# Patient Record
Sex: FEMALE | Race: ASIAN
[De-identification: names, ages, dates, MRNs, and addresses within clinical notes are randomized per-mention and may not be internally consistent; named-entity substitution may affect disease eponyms.]

---

## 2020-09-24 ENCOUNTER — APPOINTMENT (OUTPATIENT)
Dept: ULTRASOUND IMAGING | Facility: CLINIC | Age: 35
End: 2020-09-24
Payer: COMMERCIAL

## 2020-09-24 PROCEDURE — 76830 TRANSVAGINAL US NON-OB: CPT

## 2020-09-29 ENCOUNTER — OUTPATIENT (OUTPATIENT)
Dept: OUTPATIENT SERVICES | Facility: HOSPITAL | Age: 35
LOS: 1 days | End: 2020-09-29

## 2020-09-30 ENCOUNTER — TRANSCRIPTION ENCOUNTER (OUTPATIENT)
Age: 35
End: 2020-09-30

## 2024-08-27 ENCOUNTER — APPOINTMENT (OUTPATIENT)
Dept: OTOLARYNGOLOGY | Facility: CLINIC | Age: 39
End: 2024-08-27

## 2024-08-27 DIAGNOSIS — Z78.9 OTHER SPECIFIED HEALTH STATUS: ICD-10-CM

## 2024-08-27 DIAGNOSIS — S01.319A LACERATION W/OUT FOREIGN BODY OF UNSPECIFIED EAR, INITIAL ENCOUNTER: ICD-10-CM

## 2024-08-27 DIAGNOSIS — Z87.891 PERSONAL HISTORY OF NICOTINE DEPENDENCE: ICD-10-CM

## 2024-08-27 DIAGNOSIS — H92.02 OTALGIA, LEFT EAR: ICD-10-CM

## 2024-08-27 DIAGNOSIS — Z82.3 FAMILY HISTORY OF STROKE: ICD-10-CM

## 2024-08-27 DIAGNOSIS — Z86.39 PERSONAL HISTORY OF OTHER ENDOCRINE, NUTRITIONAL AND METABOLIC DISEASE: ICD-10-CM

## 2024-08-27 PROCEDURE — 12001 RPR S/N/AX/GEN/TRNK 2.5CM/<: CPT

## 2024-08-27 PROCEDURE — 99204 OFFICE O/P NEW MOD 45 MIN: CPT | Mod: 25

## 2024-08-27 RX ORDER — CIPROFLOXACIN HYDROCHLORIDE 250 MG/1
250 TABLET, FILM COATED ORAL
Qty: 14 | Refills: 0 | Status: ACTIVE | COMMUNITY
Start: 2024-08-27 | End: 1900-01-01

## 2024-08-27 RX ORDER — LEVOTHYROXINE SODIUM 0.05 MG/1
50 TABLET ORAL
Refills: 0 | Status: ACTIVE | COMMUNITY

## 2024-08-27 RX ORDER — ALLANTOIN 5 MG/G
GEL TOPICAL
Qty: 1 | Refills: 0 | Status: ACTIVE | COMMUNITY
Start: 2024-08-27 | End: 1900-01-01

## 2024-09-02 NOTE — HISTORY OF PRESENT ILLNESS
[de-identified] : CC: ear laceration   HISTORY OF PRESENT ILLNESS: Ana Luisa Caputo is a 40 y/o female who presents today with ear laceration she woke up this morning and found her earring which is a lawson stud to be missing there's also an apparent laceration as a result  minimal bleeding and pain but there is a significant deformity otherwise healthy   REVIEW OF SYSTEMS: General ROS: negative for - chills, fatigue or fever Psychological ROS: negative for - anxiety or depression Ophthalmic ROS: negative for - blurry vision, decreased vision or double vision ENT ROS: negative except as noted from HPI Allergy and Immunology ROS: negative except as noted from HPI Hematological and Lymphatic ROS: negative for - bleeding problems Endocrine ROS: negative for - malaise/lethargy Respiratory ROS: negative for - stridor Cardiovascular ROS: negative for - chest pain Gastrointestinal ROS: negative for - appetite loss or nausea/vomiting Genitourinary ROS: negative for - incontinence Musculoskeletal ROS: negative for - gait disturbance Neurological ROS: negative for - behavioral changes Dermatological ROS: negative for - nail changes   Physical Exam:   GENERAL APPEARANCE: Well-developed and No Acute Distress. COMMUNICATION: Able to Communicate. Strong Voice.   HEAD AND FACE Eyes: Testing of ocular motility including primary gaze alignment normal. Inspection and Appearance: No evidence of lesions or masses Palpation: Palpation of the face reveals no sinus tenderness Salivary Glands: Symmetric without masses Facial Strength: Symmetric without evidence of facial paralysis   EAR, NOSE, MOUTH, and THROAT: Ear Canals and Tympanic Membranes, Bilateral: No evidence of inflammation or lesions. Thresholds: Clinical speech reception thresholds normal. External, Nose and Auricle: No lesions or masses. Nasal, Mucosa, Septum, and Turbinates: See endoscopy.   NECK: Evaluation: No evidence of masses or crepitus. The neck is symmetric and the trachea is in the midline position. Thyroid: No evidence of enlargement, tenderness or mass. Neck Lymph Nodes: WNL. Respiratory: Inspection of the chest including symmetry, expansion and/or assessment of respiratory effort normal. Cardiovascular: Evaluation of peripheral vascular system by observation and palpation of capillary refill, normal. Neurological/Psychiatric: Alert, Oriented, Mood, and Affect Normal.   Ear laceration repair (12001) after informed consent was obtained, the wound was first clenaed with alcohol wipes and subsequently with betadine solution. the ear was then anesthesized with 1% lidocaine with 1:100,000 epinephrine. the wound edges was then freshened up with an 11 blade and a curved iris scissors were used to undermine the skin. a 6-0 nylon was then used to approximate the edges in an interrupted fashion. after the repair, the skin edges were well approximated. this completed the procedure, the patient tolerated it well.   IMPRESSION: Ms. Caputo is a pleasant 39 year old lady with a left ear laceration from extrusion of her earring, s/p repair   PLAN: -cipro/mederma prescribed -RTC next week for suture removal   Abdon Mills MD [facility] Rhinology and Skull Base Surgery Department of Otolaryngology- Head and Neck Surgery [facility]

## 2024-09-02 NOTE — HISTORY OF PRESENT ILLNESS
[de-identified] : CC: ear laceration   HISTORY OF PRESENT ILLNESS: Ana Luisa Caputo is a 40 y/o female who presents today with ear laceration she woke up this morning and found her earring which is a lawson stud to be missing there's also an apparent laceration as a result  minimal bleeding and pain but there is a significant deformity otherwise healthy   REVIEW OF SYSTEMS: General ROS: negative for - chills, fatigue or fever Psychological ROS: negative for - anxiety or depression Ophthalmic ROS: negative for - blurry vision, decreased vision or double vision ENT ROS: negative except as noted from HPI Allergy and Immunology ROS: negative except as noted from HPI Hematological and Lymphatic ROS: negative for - bleeding problems Endocrine ROS: negative for - malaise/lethargy Respiratory ROS: negative for - stridor Cardiovascular ROS: negative for - chest pain Gastrointestinal ROS: negative for - appetite loss or nausea/vomiting Genitourinary ROS: negative for - incontinence Musculoskeletal ROS: negative for - gait disturbance Neurological ROS: negative for - behavioral changes Dermatological ROS: negative for - nail changes   Physical Exam:   GENERAL APPEARANCE: Well-developed and No Acute Distress. COMMUNICATION: Able to Communicate. Strong Voice.   HEAD AND FACE Eyes: Testing of ocular motility including primary gaze alignment normal. Inspection and Appearance: No evidence of lesions or masses Palpation: Palpation of the face reveals no sinus tenderness Salivary Glands: Symmetric without masses Facial Strength: Symmetric without evidence of facial paralysis   EAR, NOSE, MOUTH, and THROAT: Ear Canals and Tympanic Membranes, Bilateral: No evidence of inflammation or lesions. Thresholds: Clinical speech reception thresholds normal. External, Nose and Auricle: No lesions or masses. Nasal, Mucosa, Septum, and Turbinates: See endoscopy.   NECK: Evaluation: No evidence of masses or crepitus. The neck is symmetric and the trachea is in the midline position. Thyroid: No evidence of enlargement, tenderness or mass. Neck Lymph Nodes: WNL. Respiratory: Inspection of the chest including symmetry, expansion and/or assessment of respiratory effort normal. Cardiovascular: Evaluation of peripheral vascular system by observation and palpation of capillary refill, normal. Neurological/Psychiatric: Alert, Oriented, Mood, and Affect Normal.   Ear laceration repair (12001) after informed consent was obtained, the wound was first clenaed with alcohol wipes and subsequently with betadine solution. the ear was then anesthesized with 1% lidocaine with 1:100,000 epinephrine. the wound edges was then freshened up with an 11 blade and a curved iris scissors were used to undermine the skin. a 6-0 nylon was then used to approximate the edges in an interrupted fashion. after the repair, the skin edges were well approximated. this completed the procedure, the patient tolerated it well.   IMPRESSION: Ms. Caputo is a pleasant 39 year old lady with a left ear laceration from extrusion of her earring, s/p repair   PLAN: -cipro/mederma prescribed -RTC next week for suture removal   Abdon Mills MD [facility] Rhinology and Skull Base Surgery Department of Otolaryngology- Head and Neck Surgery [facility]

## 2024-09-05 ENCOUNTER — APPOINTMENT (OUTPATIENT)
Dept: OTOLARYNGOLOGY | Facility: CLINIC | Age: 39
End: 2024-09-05
Payer: COMMERCIAL

## 2024-09-05 DIAGNOSIS — H92.02 OTALGIA, LEFT EAR: ICD-10-CM

## 2024-09-05 DIAGNOSIS — S01.319A LACERATION W/OUT FOREIGN BODY OF UNSPECIFIED EAR, INITIAL ENCOUNTER: ICD-10-CM

## 2024-09-05 PROCEDURE — 99212 OFFICE O/P EST SF 10 MIN: CPT

## 2024-09-08 NOTE — HISTORY OF PRESENT ILLNESS
[de-identified] : CC: ear laceration   HISTORY OF PRESENT ILLNESS: Ana Luisa Caputo is a 40 y/o female who presents today with ear laceration she woke up this morning and found her earring which is a lawson stud to be missing there's also an apparent laceration as a result  minimal bleeding and pain but there is a significant deformity otherwise healthy  1 week s/p left ear lac repair no complaints   REVIEW OF SYSTEMS: General ROS: negative for - chills, fatigue or fever Psychological ROS: negative for - anxiety or depression Ophthalmic ROS: negative for - blurry vision, decreased vision or double vision ENT ROS: negative except as noted from HPI Allergy and Immunology ROS: negative except as noted from HPI Hematological and Lymphatic ROS: negative for - bleeding problems Endocrine ROS: negative for - malaise/lethargy Respiratory ROS: negative for - stridor Cardiovascular ROS: negative for - chest pain Gastrointestinal ROS: negative for - appetite loss or nausea/vomiting Genitourinary ROS: negative for - incontinence Musculoskeletal ROS: negative for - gait disturbance Neurological ROS: negative for - behavioral changes Dermatological ROS: negative for - nail changes   Physical Exam:   GENERAL APPEARANCE: Well-developed and No Acute Distress. COMMUNICATION: Able to Communicate. Strong Voice.   HEAD AND FACE Eyes: Testing of ocular motility including primary gaze alignment normal. Inspection and Appearance: No evidence of lesions or masses Palpation: Palpation of the face reveals no sinus tenderness Salivary Glands: Symmetric without masses Facial Strength: Symmetric without evidence of facial paralysis   EAR, NOSE, MOUTH, and THROAT: Ear Canals and Tympanic Membranes, Bilateral: No evidence of inflammation or lesions. left ear sutures removed, however, there is poor approximation of the tissues and there's incomplete closure Thresholds: Clinical speech reception thresholds normal. External, Nose and Auricle: No lesions or masses. Nasal, Mucosa, Septum, and Turbinates: See endoscopy.   NECK: Evaluation: No evidence of masses or crepitus. The neck is symmetric and the trachea is in the midline position. Thyroid: No evidence of enlargement, tenderness or mass. Neck Lymph Nodes: WNL. Respiratory: Inspection of the chest including symmetry, expansion and/or assessment of respiratory effort normal. Cardiovascular: Evaluation of peripheral vascular system by observation and palpation of capillary refill, normal. Neurological/Psychiatric: Alert, Oriented, Mood, and Affect Normal.     IMPRESSION: Ms. Caputo is a pleasant 39 year old lady with a left ear laceration from extrusion of her earring, s/p repair   PLAN: -as she's schedule to see Dr. Gusman for Botox, I have advised her to seek definitive repair with her as well. patient expressed understanding   MD ELIZ [facility] Rhinology and Skull Base Surgery Department of Otolaryngology- Head and Neck Surgery [facility]

## 2024-09-08 NOTE — HISTORY OF PRESENT ILLNESS
[de-identified] : CC: ear laceration   HISTORY OF PRESENT ILLNESS: Ana Luisa Caputo is a 40 y/o female who presents today with ear laceration she woke up this morning and found her earring which is a lawson stud to be missing there's also an apparent laceration as a result  minimal bleeding and pain but there is a significant deformity otherwise healthy  1 week s/p left ear lac repair no complaints   REVIEW OF SYSTEMS: General ROS: negative for - chills, fatigue or fever Psychological ROS: negative for - anxiety or depression Ophthalmic ROS: negative for - blurry vision, decreased vision or double vision ENT ROS: negative except as noted from HPI Allergy and Immunology ROS: negative except as noted from HPI Hematological and Lymphatic ROS: negative for - bleeding problems Endocrine ROS: negative for - malaise/lethargy Respiratory ROS: negative for - stridor Cardiovascular ROS: negative for - chest pain Gastrointestinal ROS: negative for - appetite loss or nausea/vomiting Genitourinary ROS: negative for - incontinence Musculoskeletal ROS: negative for - gait disturbance Neurological ROS: negative for - behavioral changes Dermatological ROS: negative for - nail changes   Physical Exam:   GENERAL APPEARANCE: Well-developed and No Acute Distress. COMMUNICATION: Able to Communicate. Strong Voice.   HEAD AND FACE Eyes: Testing of ocular motility including primary gaze alignment normal. Inspection and Appearance: No evidence of lesions or masses Palpation: Palpation of the face reveals no sinus tenderness Salivary Glands: Symmetric without masses Facial Strength: Symmetric without evidence of facial paralysis   EAR, NOSE, MOUTH, and THROAT: Ear Canals and Tympanic Membranes, Bilateral: No evidence of inflammation or lesions. left ear sutures removed, however, there is poor approximation of the tissues and there's incomplete closure Thresholds: Clinical speech reception thresholds normal. External, Nose and Auricle: No lesions or masses. Nasal, Mucosa, Septum, and Turbinates: See endoscopy.   NECK: Evaluation: No evidence of masses or crepitus. The neck is symmetric and the trachea is in the midline position. Thyroid: No evidence of enlargement, tenderness or mass. Neck Lymph Nodes: WNL. Respiratory: Inspection of the chest including symmetry, expansion and/or assessment of respiratory effort normal. Cardiovascular: Evaluation of peripheral vascular system by observation and palpation of capillary refill, normal. Neurological/Psychiatric: Alert, Oriented, Mood, and Affect Normal.     IMPRESSION: Ms. Caputo is a pleasant 39 year old lady with a left ear laceration from extrusion of her earring, s/p repair   PLAN: -as she's schedule to see Dr. Gusman for Botox, I have advised her to seek definitive repair with her as well. patient expressed understanding   MD ELIZ [facility] Rhinology and Skull Base Surgery Department of Otolaryngology- Head and Neck Surgery [facility]

## 2024-09-23 ENCOUNTER — APPOINTMENT (OUTPATIENT)
Dept: OTOLARYNGOLOGY | Facility: CLINIC | Age: 39
End: 2024-09-23
Payer: COMMERCIAL

## 2024-09-23 ENCOUNTER — APPOINTMENT (OUTPATIENT)
Dept: OTOLARYNGOLOGY | Facility: CLINIC | Age: 39
End: 2024-09-23
Payer: SELF-PAY

## 2024-09-23 VITALS — HEIGHT: 64 IN | WEIGHT: 128 LBS | BODY MASS INDEX: 21.85 KG/M2

## 2024-09-23 PROCEDURE — 11442 EXC FACE-MM B9+MARG 1.1-2 CM: CPT

## 2024-09-23 PROCEDURE — 99203 OFFICE O/P NEW LOW 30 MIN: CPT | Mod: 25

## 2024-09-23 PROCEDURE — 11440 EXC FACE-MM B9+MARG 0.5 CM/<: CPT

## 2024-09-23 PROCEDURE — D0090A COSMETIC BOTOX: CUSTOM

## 2024-09-23 NOTE — ASSESSMENT
[FreeTextEntry1] : 39 year old female with left ear cartilage deformity sp repair of this today. Return in 2 weeks.   We also discussed TMJ botox - 100 units every 3 months. We will obtain auth.

## 2024-09-23 NOTE — PROCEDURE
[FreeTextEntry1] : Botox [FreeTextEntry2] : Facial aging [FreeTextEntry3] : BELKIS ROGER presents for botox injection. All risks, benefits, and alternatives were explained to the patient, including risk of asymmetry, ptosis, need for touch-up. The risks of bruising and edema were also discussed. The patient must wait 7-10 days for full effect of botox. The patient gave written informed consent to proceed.   Procedure: The area was cleaned with alcohol wipes. The area was injected 1 cc syringe with 30G needle.   Dilution: 0.1 cc = 4 U, dilution 2.5 cc NS in 100 U vial Product used: Botox Areas injected:  Glabella: 16 Forehead: 10 Nasalis: 4 Crow's feet: Nasalis: Lip: Lot# see diagram Exp: see diagram  Ice was applied to the area after procedure. The patient was given post-operative instructions.

## 2024-09-23 NOTE — PROCEDURE
[FreeTextEntry1] : Left ear deformity repair [FreeTextEntry2] : Left ear deformity [FreeTextEntry3] : Patient name: BELKIS ROGER   MRN: [id number]   Date: Sep 23 2024 10:30AM   Surgeon: Malorie Gumsan MD  Assistants: None  Procedure: Excision of left ear deformity  Pre-operative diagnosis: Left ear deformity Post-operative diagnosis: same  Indications: This is a 39 year female with a left ear deformity from a piercing. All RBA were discussed with the patient, including risk of scar, infection wound dehiscence.  Procedure: After written informed consent and a surgical timeout, the area was injected with 1% lidocaine with epinephrine and 0.25% marcaine with epinephrine 1:1 mixture. After anesthesia, the area was prepped with iodine and draped in a sterile fashion. A elliptical incision was made with a 15 blade around the deformity. This was 0.5 cm in length. The lesion was released from the deep tissue layer. The wound was irrigated. Hemostasis was achieved. The subcutaneous layer was closed with a 5-0 monocryl suture. The skin edges were reapproximated with a 5-0 fast absorbing suture in vertical mattress fashion. The wound was cleaned and bacitracin was applied. A pressure dressing was then applied. Patient tolerated well.  EBL: Minimal  Specimens: None  Disposition: Discharge to home. Post-op instructions given.

## 2024-09-23 NOTE — REASON FOR VISIT
[Initial Evaluation] : an initial evaluation for [FreeTextEntry2] : TMJ arthalgia and left ear laceration

## 2024-09-23 NOTE — HISTORY OF PRESENT ILLNESS
[de-identified] : 39 year old female with left ear piercing tear - this occurred while sleeping.This was repaired by Dr. Mills but still noted a deformity of the ear with a notch.   She also reports severe TMJ symptoms- clicking and have severe bruxism at night. Has been on NSAIDs and uses mouthguard with some relief. She reports headaches and neck pain.